# Patient Record
Sex: MALE
[De-identification: names, ages, dates, MRNs, and addresses within clinical notes are randomized per-mention and may not be internally consistent; named-entity substitution may affect disease eponyms.]

---

## 2018-06-20 ENCOUNTER — APPOINTMENT (OUTPATIENT)
Dept: INTERNAL MEDICINE | Facility: CLINIC | Age: 23
End: 2018-06-20

## 2018-06-20 ENCOUNTER — APPOINTMENT (OUTPATIENT)
Dept: INTERNAL MEDICINE | Facility: CLINIC | Age: 23
End: 2018-06-20
Payer: MEDICAID

## 2018-06-20 VITALS
WEIGHT: 193 LBS | BODY MASS INDEX: 31.02 KG/M2 | SYSTOLIC BLOOD PRESSURE: 128 MMHG | HEART RATE: 70 BPM | OXYGEN SATURATION: 99 % | HEIGHT: 66 IN | TEMPERATURE: 98.1 F | DIASTOLIC BLOOD PRESSURE: 73 MMHG | RESPIRATION RATE: 12 BRPM

## 2018-06-20 DIAGNOSIS — Z00.00 ENCOUNTER FOR GENERAL ADULT MEDICAL EXAMINATION W/OUT ABNORMAL FINDINGS: ICD-10-CM

## 2018-06-20 DIAGNOSIS — F39 UNSPECIFIED MOOD [AFFECTIVE] DISORDER: ICD-10-CM

## 2018-06-20 DIAGNOSIS — Z83.3 FAMILY HISTORY OF DIABETES MELLITUS: ICD-10-CM

## 2018-06-20 DIAGNOSIS — F17.210 NICOTINE DEPENDENCE, CIGARETTES, UNCOMPLICATED: ICD-10-CM

## 2018-06-20 DIAGNOSIS — Z82.0 FAMILY HISTORY OF EPILEPSY AND OTHER DISEASES OF THE NERVOUS SYSTEM: ICD-10-CM

## 2018-06-20 DIAGNOSIS — Z82.49 FAMILY HISTORY OF ISCHEMIC HEART DISEASE AND OTHER DISEASES OF THE CIRCULATORY SYSTEM: ICD-10-CM

## 2018-06-20 PROCEDURE — 99385 PREV VISIT NEW AGE 18-39: CPT | Mod: 25

## 2018-06-20 PROCEDURE — 99203 OFFICE O/P NEW LOW 30 MIN: CPT | Mod: 25

## 2018-06-24 LAB
25(OH)D3 SERPL-MCNC: 26.6 NG/ML
ALBUMIN SERPL ELPH-MCNC: 4.7 G/DL
ALP BLD-CCNC: 58 U/L
ALT SERPL-CCNC: 20 U/L
ANION GAP SERPL CALC-SCNC: 13 MMOL/L
APPEARANCE: CLEAR
AST SERPL-CCNC: 21 U/L
BILIRUB SERPL-MCNC: 0.7 MG/DL
BILIRUBIN URINE: NEGATIVE
BLOOD URINE: NEGATIVE
BUN SERPL-MCNC: 9 MG/DL
CALCIUM SERPL-MCNC: 9.6 MG/DL
CHLORIDE SERPL-SCNC: 101 MMOL/L
CHOLEST SERPL-MCNC: 183 MG/DL
CHOLEST/HDLC SERPL: 4 RATIO
CO2 SERPL-SCNC: 27 MMOL/L
COLOR: YELLOW
CREAT SERPL-MCNC: 0.88 MG/DL
GLUCOSE QUALITATIVE U: NEGATIVE MG/DL
GLUCOSE SERPL-MCNC: 84 MG/DL
HDLC SERPL-MCNC: 46 MG/DL
KETONES URINE: NEGATIVE
LDLC SERPL CALC-MCNC: 72 MG/DL
LEUKOCYTE ESTERASE URINE: NEGATIVE
NITRITE URINE: NEGATIVE
PH URINE: 6
POTASSIUM SERPL-SCNC: 4.6 MMOL/L
PROT SERPL-MCNC: 7.1 G/DL
PROTEIN URINE: NEGATIVE MG/DL
SODIUM SERPL-SCNC: 141 MMOL/L
SPECIFIC GRAVITY URINE: 1.01
TRIGL SERPL-MCNC: 326 MG/DL
TSH SERPL-ACNC: 2.87 UIU/ML
UROBILINOGEN URINE: NEGATIVE MG/DL
VIT B12 SERPL-MCNC: 329 PG/ML

## 2018-06-24 NOTE — HISTORY OF PRESENT ILLNESS
[de-identified] : 23 y.o male, presents for annual physical. He is accompannied by his girfriend\par Pt states he has a hx of left eye shingles as a child and has had left eye issues since than. he was told that he will need a corneal transplant but as per pt was not done at that time because he was too young. He wants a referral to see an ophthalmologist \par Pt also states he wants to go see a psychiatrist. he reports that sometimes he feels like he wants to be alone. Also he and his girlfriend state that sometimes when he is mad he yells a lot and when he is happy he is "very happy"\par He has had these issues for many years and his mom had tried to get him to see a psychiatrist in the past but he had refused. Now he says his girlfriend is pregnant and he will be a dad he wants to take care of himself\par Denies suicidal ideation, intent to harm himself or others

## 2018-06-24 NOTE — ASSESSMENT
[FreeTextEntry1] : Physical\par blood work ordered\par \par Mood changes\par referred to psychiatry\par \par Hx of eye shingles as a child, visual disturbances\par referred to opthalmology \par \par F/u in one week for lab results

## 2018-06-24 NOTE — HEALTH RISK ASSESSMENT
[Very Good] : ~his/her~  mood as very good [Employed] : employed [High School] : high school [Sexually Active] : sexually active [Smoke Detector] : smoke detector [Carbon Monoxide Detector] : carbon monoxide detector [Seat Belt] :  uses seat belt [Sunscreen] : uses sunscreen [] : No [Guns at Home] : no guns at home [Travel to Developing Areas] : does not  travel to developing areas [TB Exposure] : is not being exposed to tuberculosis [de-identified] : with girlfriend [FreeTextEntry2] :

## 2018-07-03 LAB
BASOPHILS # BLD AUTO: 0.02 K/UL
BASOPHILS NFR BLD AUTO: 0.3 %
EOSINOPHIL # BLD AUTO: 0.53 K/UL
EOSINOPHIL NFR BLD AUTO: 7.5 %
HCT VFR BLD CALC: 43.8 %
HGB BLD-MCNC: 14.2 G/DL
IMM GRANULOCYTES NFR BLD AUTO: 0.3 %
LYMPHOCYTES # BLD AUTO: 2.44 K/UL
LYMPHOCYTES NFR BLD AUTO: 34.4 %
MAN DIFF?: NORMAL
MCHC RBC-ENTMCNC: 30.5 PG
MCHC RBC-ENTMCNC: 32.4 GM/DL
MCV RBC AUTO: 94 FL
MONOCYTES # BLD AUTO: 0.48 K/UL
MONOCYTES NFR BLD AUTO: 6.8 %
NEUTROPHILS # BLD AUTO: 3.61 K/UL
NEUTROPHILS NFR BLD AUTO: 50.7 %
PLATELET # BLD AUTO: 414 K/UL
RBC # BLD: 4.66 M/UL
RBC # FLD: 13.5 %
WBC # FLD AUTO: 7.1 K/UL

## 2019-08-10 ENCOUNTER — EMERGENCY (EMERGENCY)
Facility: HOSPITAL | Age: 24
LOS: 1 days | Discharge: HOME | End: 2019-08-10
Admitting: EMERGENCY MEDICINE
Payer: SELF-PAY

## 2019-08-10 VITALS
DIASTOLIC BLOOD PRESSURE: 73 MMHG | RESPIRATION RATE: 16 BRPM | TEMPERATURE: 98 F | HEART RATE: 71 BPM | OXYGEN SATURATION: 100 % | SYSTOLIC BLOOD PRESSURE: 112 MMHG

## 2019-08-10 PROCEDURE — 99283 EMERGENCY DEPT VISIT LOW MDM: CPT

## 2019-08-10 NOTE — ED PROVIDER NOTE - NSFOLLOWUPCLINICS_GEN_ALL_ED_FT
[facility]  Behavioral Health  [address] [place] [address]  Phone: [phone]  Fax:   Follow Up Time:

## 2019-08-10 NOTE — ED ADULT TRIAGE NOTE - CHIEF COMPLAINT QUOTE
Pt with suicidal thought and as per ems, his girlfriend called and stated that he wanted to hurt himself. Pt denies hallucinations or attempt

## 2019-08-10 NOTE — ED PROVIDER NOTE - OBJECTIVE STATEMENT
23 y/o M hx Anxiety BIBA secondary to verbal altercation with his girlfriend . Denies any physical altercation. Admit that he was attempting to retrieve his phone., nut huis girlfriend wouldn't give it to him. Admits that he then mentioned to her that he would die without his phone. Patient stated his words were interpreted incorrectly , hence the EMS call.   Denies falling, punching  or kicking any objects. Explicitly  denies SI/HI/VH/AH .  Denies pain, dizziness, SOB , fever, chills, chest/abdominal  discomfort.  Denies use of alcohol or illicit drugs. No evidence of physical injuries, broken skin or deformities.

## 2019-08-10 NOTE — ED BEHAVIORAL HEALTH NOTE - BEHAVIORAL HEALTH NOTE
OLAMIDE informed by [facility] Bo that pt is being d/c and needs transportation home and resource referrals.  OLAMIDE met with pt-pt reports that he is staying with his boss at [facility] [place], [place].  Pt states he is unsure how to get home and not sure how to travel via public transportation.  Pt is also dyslexic and has difficulty with public transportation.  OLAMIDE arranged taxi cab for pt to above address.  OLAMIDE also provided pt with referral information to mental health clinics in [place].  Pt accepted information and reports he will follow up as needed.  [employer] to transport pt home.

## 2019-08-10 NOTE — ED BEHAVIORAL HEALTH NOTE - BEHAVIORAL HEALTH NOTE
SW contacted pt's ex-girlfriend, Alona Rousseau, at [phone] to obtain collateral information.  Alona reports that she was told by pt's mother that he had a 'mental impairment' when he was younger that was described by her as having had 'shingles when he was younger that went to the brain'.  Pt's ex-girlfriend reports she does not know exactly what it was.  She reports that earlier today they took a ride together to the store, and when they were done she asked him to leave, however he refused and threatened to harm himself.  She states that pt stated 'Do you want me to kill myself for real?' when she asked him to leave once they were done with the grocery store errands.  Pt's ex-girlfriend reports that pt has cut himself in the past, however she states that he denies that he did it to hurt himself.  Pt's ex-girlfriend reports that when pt gets upset, he 'doesn't stop'.  She states that pt has been staying with his boss for the past month and a half.  She states that pt is not allowed to return to her residence.

## 2019-08-10 NOTE — ED ADULT NURSE NOTE - OBJECTIVE STATEMENT
Pt received in bh area with no complaints. Pt states that he and his ex-girlfriend was involved in a verbal altercation earlier today. Pt states that his ex-girlfriend "lied to the police and told them that I wanted to hurt myself and that I cut my wrist". Pt with no signs of injury to b/l wrist. Pt further denies si, hi, ah, vh, th, etoh/substance use. Pt states that he currently resides with his employer. States that he will avoid contact with ex-girlfriend.

## 2021-12-24 NOTE — ED PROVIDER NOTE - NSSTREETDRUGTY_GEN_ALL_CORE_SD
Airway patent, TM normal bilaterally, mild facial edema and erythema, per pt "feels like lips are about to explode," no airway compromise, no tongue edema, no drooling, no stridor. marijuana

## 2023-08-03 NOTE — ED PROVIDER NOTE - CLINICAL SUMMARY MEDICAL DECISION MAKING FREE TEXT BOX
Medical evaluation performed. There is no clinical evidence of intoxication or any acute medical problem requiring immediate intervention. 19 y/o M   Medical evaluation performed. There is no clinical evidence of intoxication or any acute medical problem requiring immediate intervention. Recommend following up with [facility]. Bexarotene Pregnancy And Lactation Text: This medication is Pregnancy Category X and should not be given to women who are pregnant or may become pregnant. This medication should not be used if you are breast feeding.